# Patient Record
Sex: FEMALE | Race: WHITE | NOT HISPANIC OR LATINO | Employment: OTHER | URBAN - METROPOLITAN AREA
[De-identification: names, ages, dates, MRNs, and addresses within clinical notes are randomized per-mention and may not be internally consistent; named-entity substitution may affect disease eponyms.]

---

## 2010-07-27 LAB — CRC RECOMMENDATION EXT: NORMAL

## 2017-10-17 ENCOUNTER — HISTORICAL (OUTPATIENT)
Dept: LAB | Facility: HOSPITAL | Age: 67
End: 2017-10-17

## 2017-10-17 LAB
ABS NEUT (OLG): 4.8 X10(3)/MCL (ref 2.1–9.2)
ALBUMIN SERPL-MCNC: 4.1 GM/DL (ref 3.4–5)
ALBUMIN/GLOB SERPL: 1.2 RATIO (ref 1.1–2)
ALP SERPL-CCNC: 94 UNIT/L (ref 38–126)
ALT SERPL-CCNC: 38 UNIT/L (ref 12–78)
AST SERPL-CCNC: 25 UNIT/L (ref 15–37)
BASOPHILS # BLD AUTO: 0 X10(3)/MCL (ref 0–0.2)
BASOPHILS NFR BLD AUTO: 1 %
BILIRUB SERPL-MCNC: 0.6 MG/DL (ref 0.2–1)
BILIRUBIN DIRECT+TOT PNL SERPL-MCNC: 0.1 MG/DL (ref 0–0.5)
BILIRUBIN DIRECT+TOT PNL SERPL-MCNC: 0.5 MG/DL (ref 0–0.8)
BUN SERPL-MCNC: 24 MG/DL (ref 7–18)
CALCIUM SERPL-MCNC: 10.2 MG/DL (ref 8.5–10.1)
CHLORIDE SERPL-SCNC: 105 MMOL/L (ref 98–107)
CHOLEST SERPL-MCNC: 183 MG/DL (ref 0–200)
CHOLEST/HDLC SERPL: 2.3 {RATIO} (ref 0–4)
CO2 SERPL-SCNC: 27 MMOL/L (ref 21–32)
CREAT SERPL-MCNC: 0.58 MG/DL (ref 0.55–1.02)
EOSINOPHIL # BLD AUTO: 0.2 X10(3)/MCL (ref 0–0.9)
EOSINOPHIL NFR BLD AUTO: 3 %
ERYTHROCYTE [DISTWIDTH] IN BLOOD BY AUTOMATED COUNT: 12.4 % (ref 11.5–17)
GLOBULIN SER-MCNC: 3.3 GM/DL (ref 2.4–3.5)
GLUCOSE SERPL-MCNC: 82 MG/DL (ref 74–106)
HCT VFR BLD AUTO: 42.1 % (ref 37–47)
HDLC SERPL-MCNC: 80 MG/DL (ref 35–60)
HGB BLD-MCNC: 13.5 GM/DL (ref 12–16)
HIV 1+2 AB+HIV1 P24 AG SERPL QL IA: NEGATIVE
LDLC SERPL CALC-MCNC: 91 MG/DL (ref 0–129)
LYMPHOCYTES # BLD AUTO: 1.4 X10(3)/MCL (ref 0.6–4.6)
LYMPHOCYTES NFR BLD AUTO: 20 %
MCH RBC QN AUTO: 30.6 PG (ref 27–31)
MCHC RBC AUTO-ENTMCNC: 32.1 GM/DL (ref 33–36)
MCV RBC AUTO: 95.5 FL (ref 80–94)
MONOCYTES # BLD AUTO: 0.4 X10(3)/MCL (ref 0.1–1.3)
MONOCYTES NFR BLD AUTO: 6 %
NEUTROPHILS # BLD AUTO: 4.8 X10(3)/MCL (ref 2.1–9.2)
NEUTROPHILS NFR BLD AUTO: 70 %
PLATELET # BLD AUTO: 284 X10(3)/MCL (ref 130–400)
PMV BLD AUTO: 9.6 FL (ref 9.4–12.4)
POTASSIUM SERPL-SCNC: 4.4 MMOL/L (ref 3.5–5.1)
PROT SERPL-MCNC: 7.4 GM/DL (ref 6.4–8.2)
RBC # BLD AUTO: 4.41 X10(6)/MCL (ref 4.2–5.4)
RPR SER QL: NORMAL
SODIUM SERPL-SCNC: 140 MMOL/L (ref 136–145)
TRIGL SERPL-MCNC: 61 MG/DL (ref 30–150)
TSH SERPL-ACNC: 1.5 MIU/ML (ref 0.36–3.74)
VLDLC SERPL CALC-MCNC: 12 MG/DL
WBC # SPEC AUTO: 6.9 X10(3)/MCL (ref 4.5–11.5)

## 2019-06-19 LAB
BUN SERPL-MCNC: 35 MG/DL (ref 7–18)
CHOLEST SERPL-MCNC: 222 MG/DL
CREAT SERPL-MCNC: 0.7 MG/DL (ref 0.6–1.3)
GLUCOSE SERPL-MCNC: 108 MG/DL (ref 74–106)
HDLC SERPL-MCNC: 83 MG/DL (ref 35–60)
LDLC SERPL CALC-MCNC: 122 MG/DL
TRIGL SERPL-MCNC: 84 MG/DL (ref 30–150)

## 2021-08-14 ENCOUNTER — HISTORICAL (OUTPATIENT)
Dept: LAB | Facility: HOSPITAL | Age: 71
End: 2021-08-14

## 2021-08-14 LAB
ABS NEUT (OLG): 5.2 X10(3)/MCL (ref 1.5–6.9)
ERYTHROCYTE [DISTWIDTH] IN BLOOD BY AUTOMATED COUNT: 13.3 % (ref 11.5–17)
HCT VFR BLD AUTO: 33.2 % (ref 36–48)
HGB BLD-MCNC: 10.4 GM/DL (ref 12–16)
MCH RBC QN AUTO: 31 PG (ref 27–34)
MCHC RBC AUTO-ENTMCNC: 31 GM/DL (ref 31–36)
MCV RBC AUTO: 98 FL (ref 80–99)
PLATELET # BLD AUTO: 357 X10(3)/MCL (ref 140–400)
PMV BLD AUTO: 9.9 FL (ref 6.8–10)
RBC # BLD AUTO: 3.38 X10(6)/MCL (ref 4.2–5.4)
WBC # SPEC AUTO: 7.5 X10(3)/MCL (ref 4.5–11.5)

## 2021-10-10 ENCOUNTER — HISTORICAL (OUTPATIENT)
Dept: ADMINISTRATIVE | Facility: HOSPITAL | Age: 71
End: 2021-10-10

## 2021-10-10 LAB
COLOR STL: NORMAL
COLOR STL: NORMAL
CONSISTENCY STL: NORMAL
CONSISTENCY STL: NORMAL
HEMOCCULT SP2 STL QL: NEGATIVE

## 2022-03-25 ENCOUNTER — HISTORICAL (OUTPATIENT)
Dept: RADIOLOGY | Facility: HOSPITAL | Age: 72
End: 2022-03-25

## 2022-03-25 ENCOUNTER — HISTORICAL (OUTPATIENT)
Dept: ADMINISTRATIVE | Facility: HOSPITAL | Age: 72
End: 2022-03-25

## 2022-04-10 ENCOUNTER — HISTORICAL (OUTPATIENT)
Dept: ADMINISTRATIVE | Facility: HOSPITAL | Age: 72
End: 2022-04-10
Payer: MEDICARE

## 2022-04-27 VITALS
WEIGHT: 141.13 LBS | OXYGEN SATURATION: 93 % | HEIGHT: 62 IN | BODY MASS INDEX: 25.97 KG/M2 | DIASTOLIC BLOOD PRESSURE: 82 MMHG | SYSTOLIC BLOOD PRESSURE: 138 MMHG

## 2022-05-26 ENCOUNTER — HOSPITAL ENCOUNTER (EMERGENCY)
Facility: HOSPITAL | Age: 72
Discharge: HOME | End: 2022-05-26
Attending: EMERGENCY MEDICINE
Payer: MEDICARE

## 2022-05-26 VITALS
OXYGEN SATURATION: 95 % | WEIGHT: 135 LBS | TEMPERATURE: 98 F | RESPIRATION RATE: 15 BRPM | BODY MASS INDEX: 25.49 KG/M2 | SYSTOLIC BLOOD PRESSURE: 121 MMHG | DIASTOLIC BLOOD PRESSURE: 84 MMHG | HEART RATE: 101 BPM | HEIGHT: 61 IN

## 2022-05-26 DIAGNOSIS — R19.7 NAUSEA VOMITING AND DIARRHEA: Primary | ICD-10-CM

## 2022-05-26 DIAGNOSIS — R11.2 NAUSEA VOMITING AND DIARRHEA: Primary | ICD-10-CM

## 2022-05-26 PROBLEM — I21.4 NSTEMI (NON-ST ELEVATED MYOCARDIAL INFARCTION): Status: ACTIVE | Noted: 2021-08-08

## 2022-05-26 LAB
ALBUMIN SERPL-MCNC: 3.9 GM/DL (ref 3.4–4.8)
ALBUMIN/GLOB SERPL: 1.1 RATIO (ref 1.1–2)
ALP SERPL-CCNC: 72 UNIT/L (ref 40–150)
ALT SERPL-CCNC: 20 UNIT/L (ref 0–55)
APPEARANCE UR: CLEAR
AST SERPL-CCNC: 20 UNIT/L (ref 5–34)
BACTERIA #/AREA URNS AUTO: NORMAL /HPF
BASOPHILS # BLD AUTO: 0.02 X10(3)/MCL (ref 0–0.2)
BASOPHILS NFR BLD AUTO: 0.1 %
BILIRUB UR QL STRIP.AUTO: NEGATIVE MG/DL
BILIRUBIN DIRECT+TOT PNL SERPL-MCNC: 0.5 MG/DL
BUN SERPL-MCNC: 33 MG/DL (ref 9.8–20.1)
CALCIUM SERPL-MCNC: 9.7 MG/DL (ref 8.4–10.2)
CHLORIDE SERPL-SCNC: 106 MMOL/L (ref 98–107)
CO2 SERPL-SCNC: 26 MMOL/L (ref 23–31)
COLOR UR AUTO: YELLOW
CREAT SERPL-MCNC: 0.74 MG/DL (ref 0.55–1.02)
EOSINOPHIL # BLD AUTO: 0.03 X10(3)/MCL (ref 0–0.9)
EOSINOPHIL NFR BLD AUTO: 0.2 %
ERYTHROCYTE [DISTWIDTH] IN BLOOD BY AUTOMATED COUNT: 12.9 % (ref 11.5–17)
GLOBULIN SER-MCNC: 3.5 GM/DL (ref 2.4–3.5)
GLUCOSE SERPL-MCNC: 142 MG/DL (ref 82–115)
GLUCOSE UR QL STRIP.AUTO: NEGATIVE MG/DL
HCT VFR BLD AUTO: 46.9 % (ref 37–47)
HGB BLD-MCNC: 14.8 GM/DL (ref 12–16)
IMM GRANULOCYTES # BLD AUTO: 0.05 X10(3)/MCL (ref 0–0.02)
IMM GRANULOCYTES NFR BLD AUTO: 0.4 % (ref 0–0.43)
KETONES UR QL STRIP.AUTO: NEGATIVE MG/DL
LEUKOCYTE ESTERASE UR QL STRIP.AUTO: NEGATIVE UNIT/L
LIPASE SERPL-CCNC: 63 U/L
LYMPHOCYTES # BLD AUTO: 0.22 X10(3)/MCL (ref 0.6–4.6)
LYMPHOCYTES NFR BLD AUTO: 1.6 %
MCH RBC QN AUTO: 31.8 PG (ref 27–31)
MCHC RBC AUTO-ENTMCNC: 31.6 MG/DL (ref 33–36)
MCV RBC AUTO: 100.9 FL (ref 80–94)
MONOCYTES # BLD AUTO: 0.48 X10(3)/MCL (ref 0.1–1.3)
MONOCYTES NFR BLD AUTO: 3.4 %
NEUTROPHILS # BLD AUTO: 13.1 X10(3)/MCL (ref 2.1–9.2)
NEUTROPHILS NFR BLD AUTO: 94.3 %
NITRITE UR QL STRIP.AUTO: NEGATIVE
PH UR STRIP.AUTO: 5.5 [PH]
PLATELET # BLD AUTO: 291 X10(3)/MCL (ref 130–400)
PMV BLD AUTO: 9.4 FL (ref 9.4–12.4)
POTASSIUM SERPL-SCNC: 5.4 MMOL/L (ref 3.5–5.1)
PROT SERPL-MCNC: 7.4 GM/DL (ref 5.8–7.6)
PROT UR QL STRIP.AUTO: NEGATIVE MG/DL
RBC # BLD AUTO: 4.65 X10(6)/MCL (ref 4.2–5.4)
RBC #/AREA URNS AUTO: NORMAL /HPF
RBC UR QL AUTO: ABNORMAL UNIT/L
SODIUM SERPL-SCNC: 139 MMOL/L (ref 136–145)
SP GR UR STRIP.AUTO: 1.02
SQUAMOUS #/AREA URNS AUTO: NORMAL /LPF
UROBILINOGEN UR STRIP-ACNC: 0.2 MG/DL
WBC # SPEC AUTO: 13.9 X10(3)/MCL (ref 4.5–11.5)
WBC #/AREA URNS AUTO: NORMAL /HPF

## 2022-05-26 PROCEDURE — 81001 URINALYSIS AUTO W/SCOPE: CPT | Performed by: NURSE PRACTITIONER

## 2022-05-26 PROCEDURE — 83690 ASSAY OF LIPASE: CPT | Performed by: NURSE PRACTITIONER

## 2022-05-26 PROCEDURE — 96374 THER/PROPH/DIAG INJ IV PUSH: CPT

## 2022-05-26 PROCEDURE — 63600175 PHARM REV CODE 636 W HCPCS: Performed by: NURSE PRACTITIONER

## 2022-05-26 PROCEDURE — 80053 COMPREHEN METABOLIC PANEL: CPT | Performed by: NURSE PRACTITIONER

## 2022-05-26 PROCEDURE — 36415 COLL VENOUS BLD VENIPUNCTURE: CPT | Performed by: NURSE PRACTITIONER

## 2022-05-26 PROCEDURE — 99285 EMERGENCY DEPT VISIT HI MDM: CPT | Mod: 25

## 2022-05-26 PROCEDURE — 85025 COMPLETE CBC W/AUTO DIFF WBC: CPT | Performed by: NURSE PRACTITIONER

## 2022-05-26 RX ORDER — ONDANSETRON 2 MG/ML
4 INJECTION INTRAMUSCULAR; INTRAVENOUS
Status: COMPLETED | OUTPATIENT
Start: 2022-05-26 | End: 2022-05-26

## 2022-05-26 RX ORDER — ONDANSETRON 4 MG/1
2 TABLET, FILM COATED ORAL EVERY 6 HOURS PRN
Qty: 6 TABLET | Refills: 0 | Status: SHIPPED | OUTPATIENT
Start: 2022-05-26 | End: 2022-05-29

## 2022-05-26 RX ADMIN — ONDANSETRON 4 MG: 2 INJECTION INTRAMUSCULAR; INTRAVENOUS at 03:05

## 2022-05-26 RX ADMIN — SODIUM CHLORIDE, POTASSIUM CHLORIDE, SODIUM LACTATE AND CALCIUM CHLORIDE 500 ML: 600; 310; 30; 20 INJECTION, SOLUTION INTRAVENOUS at 03:05

## 2022-05-26 NOTE — DISCHARGE INSTRUCTIONS
Clear liquids for 12 hours and then slowly advance diet.  Follow-up with your primary care provider in 2-3 days.  Return to the emergency department for fever, uncontrolled vomiting, worsening of symptoms.

## 2022-05-26 NOTE — ED PROVIDER NOTES
"Encounter Date: 5/26/2022       History     Chief Complaint   Patient presents with    Nausea    Vomiting     BIBA from [facility] for N,V,D and abd cramping     72-year-old female presents to the emergency department per EMS.  She began having nausea this morning and then began vomiting.  She said then she started having diarrhea.  She is having mild upper abdominal pain mostly to the right upper quadrant and epigastric area which she describes as "sick".  She denies fever, dysuria, cough.  She is having severe nausea at present.  She denies any sick contacts, but she does live in a nursing home. Nothing has made symptoms better and nothing has worsened them .         Review of patient's allergies indicates:   Allergen Reactions    Codeine Itching     Past Medical History:   Diagnosis Date    Arthritis     Depression     GERD (gastroesophageal reflux disease)     HLD (hyperlipidemia)     Left hemiparesis     MI (myocardial infarction)     Osteoporosis     Restless leg syndrome     Stroke     Thalamic syndrome      Past Surgical History:   Procedure Laterality Date    none       History reviewed. No pertinent family history.  Social History     Tobacco Use    Smoking status: Unknown If Ever Smoked   Substance Use Topics    Alcohol use: Not Currently    Drug use: Never     Review of Systems   Constitutional: Negative.    HENT: Negative.    Eyes: Negative.    Respiratory: Negative.  Negative for cough.    Cardiovascular: Negative.    Gastrointestinal: Positive for abdominal pain, diarrhea, nausea and vomiting.   Endocrine: Negative.    Genitourinary: Negative.  Negative for dysuria.   Musculoskeletal: Negative.    Skin: Negative.    Allergic/Immunologic: Negative.    Neurological: Negative.    Hematological: Negative.    Psychiatric/Behavioral: Negative.        Physical Exam     Initial Vitals [05/26/22 1449]   BP Pulse Resp Temp SpO2   (!) 182/111 103 16 98 °F (36.7 °C) 98 %      MAP       --     "     Physical Exam    Nursing note and vitals reviewed.  Constitutional: Vital signs are normal. She appears well-developed. She is Obese . She is cooperative.   HENT:   Head: Normocephalic and atraumatic.   Right Ear: External ear normal.   Left Ear: External ear normal.   Nose: Nose normal. Right sinus exhibits no maxillary sinus tenderness and no frontal sinus tenderness. Left sinus exhibits no maxillary sinus tenderness and no frontal sinus tenderness.   Mouth/Throat: Uvula is midline and oropharynx is clear and moist. No oropharyngeal exudate.   Mucous membranes tacky.   Eyes: Conjunctivae, EOM and lids are normal. Pupils are equal, round, and reactive to light.   Neck: Neck supple.   Normal range of motion.   Full passive range of motion without pain.     Cardiovascular: Normal rate and regular rhythm.   Pulmonary/Chest: Effort normal and breath sounds normal.   Abdominal: Abdomen is soft and flat. There is abdominal tenderness in the right upper quadrant and epigastric area.   Musculoskeletal:         General: Normal range of motion.      Cervical back: Full passive range of motion without pain, normal range of motion and neck supple.     Lymphadenopathy:        Head (right side): No submental, no submandibular, no tonsillar, no preauricular, no posterior auricular and no occipital adenopathy present.        Head (left side): No submental, no submandibular, no tonsillar, no preauricular, no posterior auricular and no occipital adenopathy present.     She has no cervical adenopathy.   Neurological: She is alert and oriented to person, place, and time. She has normal strength.   Skin: Skin is warm, dry and intact. No rash noted.   Psychiatric: She has a normal mood and affect. Her speech is normal and behavior is normal. Thought content normal. She exhibits abnormal recent memory and abnormal remote memory.   Patient has some memory issues related to her stroke in the past.         ED Course   Procedures  Labs  Reviewed   COMPREHENSIVE METABOLIC PANEL - Abnormal; Notable for the following components:       Result Value    Potassium Level 5.4 (*)     Glucose Level 142 (*)     Blood Urea Nitrogen 33.0 (*)     All other components within normal limits   URINALYSIS, REFLEX TO URINE CULTURE - Abnormal; Notable for the following components:    Blood, UA Moderate (*)     All other components within normal limits   LIPASE - Abnormal; Notable for the following components:    Lipase Level 63 (*)     All other components within normal limits   CBC WITH DIFFERENTIAL - Abnormal; Notable for the following components:    WBC 13.9 (*)     .9 (*)     MCH 31.8 (*)     MCHC 31.6 (*)     Lymph # 0.22 (*)     Neut # 13.1 (*)     IG# 0.05 (*)     All other components within normal limits   URINALYSIS, MICROSCOPIC - Normal   CBC W/ AUTO DIFFERENTIAL    Narrative:     The following orders were created for panel order CBC Auto Differential.  Procedure                               Abnormality         Status                     ---------                               -----------         ------                     CBC with Differential[[id number]]        Abnormal            Final result                 Please view results for these tests on the individual orders.     Admission on 05/26/2022   Component Date Value Ref Range Status    Sodium Level 05/26/2022 139  136 - 145 mmol/L Final    Potassium Level 05/26/2022 5.4 (A) 3.5 - 5.1 mmol/L Final    Chloride 05/26/2022 106  98 - 107 mmol/L Final    Carbon Dioxide 05/26/2022 26  23 - 31 mmol/L Final    Glucose Level 05/26/2022 142 (A) 82 - 115 mg/dL Final    Blood Urea Nitrogen 05/26/2022 33.0 (A) 9.8 - 20.1 mg/dL Final    Creatinine 05/26/2022 0.74  0.55 - 1.02 mg/dL Final    Calcium Level Total 05/26/2022 9.7  8.4 - 10.2 mg/dL Final    Protein Total 05/26/2022 7.4  5.8 - 7.6 gm/dL Final    Albumin Level 05/26/2022 3.9  3.4 - 4.8 gm/dL Final    Globulin 05/26/2022 3.5  2.4 - 3.5 gm/dL  Final    Albumin/Globulin Ratio 05/26/2022 1.1  1.1 - 2.0 ratio Final    Bilirubin Total 05/26/2022 0.5  <=1.5 mg/dL Final    Alkaline Phosphatase 05/26/2022 72  40 - 150 unit/L Final    Alanine Aminotransferase 05/26/2022 20  0 - 55 unit/L Final    Aspartate Aminotransferase 05/26/2022 20  5 - 34 unit/L Final    Estimated GFR-Non  05/26/2022 >60  mls/min/1.73/m2 Final    Color, UA 05/26/2022 Yellow  Yellow, Colorless, Other, Clear Final    Appearance, UA 05/26/2022 Clear  Clear Final    Specific Gravity, UA 05/26/2022 1.020   Final    pH, UA 05/26/2022 5.5  5.0, 5.5, 6.0, 6.5, 7.0, 7.5, 8.0, 8.5 Final    Protein, UA 05/26/2022 Negative  Negative, 300  mg/dL Final    Glucose, UA 05/26/2022 Negative  Negative, Normal mg/dL Final    Ketones, UA 05/26/2022 Negative  Negative, +1, +2, +3, +4, +5, >=160 mg/dL Final    Blood, UA 05/26/2022 Moderate (A) Negative unit/L Final    Bilirubin, UA 05/26/2022 Negative  Negative mg/dL Final    Urobilinogen, UA 05/26/2022 0.2  0.2, 1.0, Normal mg/dL Final    Nitrites, UA 05/26/2022 Negative  Negative Final    Leukocyte Esterase, UA 05/26/2022 Negative  Negative, 75  unit/L Final    Lipase Level 05/26/2022 63 (A) <=60 U/L Final    WBC 05/26/2022 13.9 (A) 4.5 - 11.5 x10(3)/mcL Final    RBC 05/26/2022 4.65  4.20 - 5.40 x10(6)/mcL Final    Hgb 05/26/2022 14.8  12.0 - 16.0 gm/dL Final    Hct 05/26/2022 46.9  37.0 - 47.0 % Final    MCV 05/26/2022 100.9 (A) 80.0 - 94.0 fL Final    MCH 05/26/2022 31.8 (A) 27.0 - 31.0 pg Final    MCHC 05/26/2022 31.6 (A) 33.0 - 36.0 mg/dL Final    RDW 05/26/2022 12.9  11.5 - 17.0 % Final    Platelet 05/26/2022 291  130 - 400 x10(3)/mcL Final    MPV 05/26/2022 9.4  9.4 - 12.4 fL Final    Neut % 05/26/2022 94.3  % Final    Lymph % 05/26/2022 1.6  % Final    Mono % 05/26/2022 3.4  % Final    Eos % 05/26/2022 0.2  % Final    Basophil % 05/26/2022 0.1  % Final    Lymph # 05/26/2022 0.22 (A) 0.6 - 4.6 x10(3)/mcL  Final    Neut # 05/26/2022 13.1 (A) 2.1 - 9.2 x10(3)/mcL Final    Mono # 05/26/2022 0.48  0.1 - 1.3 x10(3)/mcL Final    Eos # 05/26/2022 0.03  0 - 0.9 x10(3)/mcL Final    Baso # 05/26/2022 0.02  0 - 0.2 x10(3)/mcL Final    IG# 05/26/2022 0.05 (A) 0 - 0.0155 x10(3)/mcL Final    IG% 05/26/2022 0.4  0 - 0.43 % Final    Bacteria, UA 05/26/2022 Rare  None Seen, Rare, Occasional /HPF Final    RBC, UA 05/26/2022 3-5  None Seen, 0-2, 3-5, 0-5 /HPF Final    WBC, UA 05/26/2022 0-2  None Seen, 0-2, 3-5, 0-5 /HPF Final    Squamous Epithelial Cells, UA 05/26/2022 Rare  None Seen, Rare, Occasional, Occ /LPF Final            Imaging Results          CT Abdomen Pelvis  Without Contrast (Final result)  Result time 05/26/22 17:41:22    Final result by Silvino Nelson MD (05/26/22 17:41:22)                 Impression:      No acute intra-abdominal or intrapelvic process.      Electronically signed by: Silvino Nelson MD  Date:    05/26/2022  Time:    17:41             Narrative:    EXAMINATION:  CT of the abdomen pelvis without contrast    CLINICAL HISTORY:  Nausea and vomiting, abdominal pain    COMPARISON:  None    TECHNIQUE:  Routine CT of the abdomen pelvis performed without intravenous contrast    Total DLP: 478.9 mGy.cm    Automatic exposure control was utilized to reduce the patient's dose    FINDINGS:  Evaluation of the solid organs and bowel is limited without intravenous or oral contrast.  There is linear opacities seen in the right lung base which may reflect atelectasis and or scarring.    There is moderate size sliding hiatal hernia.  The noncontrast images of the liver, spleen, pancreas, gallbladder, adrenal glands, and kidneys appear unremarkable.  Mild atherosclerotic calcifications of the abdominal aorta and its branches are noted without aneurysmal dilatation.  No abdominopelvic adenopathy.  No free air, free fluid, fluid collection.  There is no high-grade bowel obstruction or bowel inflammatory changes.   The appendix appears normal.    No osseous destructive changes.  Spondylosis of the spine noted.                                 Medications   ondansetron injection 4 mg (4 mg Intravenous Given 5/26/22 1527)   lactated ringers bolus 500 mL (500 mLs Intravenous New Bag 5/26/22 1526)                          Clinical Impression:   Final diagnoses:  [R11.2, R19.7] Nausea vomiting and diarrhea (Primary)          ED Disposition Condition    Discharge Stable        ED Prescriptions     Medication Sig Dispense Start Date End Date Auth. Provider    ondansetron (ZOFRAN) 4 MG tablet Take 0.5 tablets (2 mg total) by mouth every 6 (six) hours as needed for Nausea (N/V). 6 tablet 5/26/2022 5/29/2022 ELIEZER Davila        Follow-up Information     Follow up With Specialties Details Why Contact Info    Angelo Denis MD Internal Medicine Schedule an appointment as soon as possible for a visit in 2 days  [address] [place] [address] [place], [facility] [address]  [phone]             ELIEZER Davila  05/26/22 9475

## 2022-09-17 ENCOUNTER — HISTORICAL (OUTPATIENT)
Dept: ADMINISTRATIVE | Facility: HOSPITAL | Age: 72
End: 2022-09-17
Payer: MEDICARE

## 2023-01-11 ENCOUNTER — DOCUMENTATION ONLY (OUTPATIENT)
Dept: ADMINISTRATIVE | Facility: HOSPITAL | Age: 73
End: 2023-01-11
Payer: MEDICARE

## 2023-02-21 ENCOUNTER — HOSPITAL ENCOUNTER (EMERGENCY)
Facility: HOSPITAL | Age: 73
Discharge: HOME | End: 2023-02-21
Attending: INTERNAL MEDICINE
Payer: MEDICARE

## 2023-02-21 VITALS
SYSTOLIC BLOOD PRESSURE: 139 MMHG | BODY MASS INDEX: 26.45 KG/M2 | HEART RATE: 80 BPM | RESPIRATION RATE: 18 BRPM | DIASTOLIC BLOOD PRESSURE: 76 MMHG | WEIGHT: 140 LBS | OXYGEN SATURATION: 98 % | TEMPERATURE: 99 F

## 2023-02-21 DIAGNOSIS — S00.90XA SUPERFICIAL HEAD INJURY, INITIAL ENCOUNTER: Primary | ICD-10-CM

## 2023-02-21 PROCEDURE — 25000003 PHARM REV CODE 250: Performed by: INTERNAL MEDICINE

## 2023-02-21 PROCEDURE — 99283 EMERGENCY DEPT VISIT LOW MDM: CPT

## 2023-02-21 RX ORDER — ACETAMINOPHEN 500 MG
500 TABLET ORAL
Status: COMPLETED | OUTPATIENT
Start: 2023-02-21 | End: 2023-02-21

## 2023-02-21 RX ORDER — ONDANSETRON 4 MG/1
4 TABLET, ORALLY DISINTEGRATING ORAL ONCE
Status: COMPLETED | OUTPATIENT
Start: 2023-02-21 | End: 2023-02-21

## 2023-02-21 RX ADMIN — ACETAMINOPHEN 500 MG: 500 TABLET ORAL at 01:02

## 2023-02-21 RX ADMIN — ONDANSETRON 4 MG: 4 TABLET, ORALLY DISINTEGRATING ORAL at 01:02

## 2023-02-21 NOTE — ED PROVIDER NOTES
Source of History:  Patient, no limitations    Chief complaint:  Fall (Reports fall while getting into bed just PTA hitting L temporal area on wooden side table. Denies LOC. No blood thinner.)      HPI:  Laly Horton is a 72 y.o. female presenting with Fall (Reports fall while getting into bed just PTA hitting L temporal area on wooden side table. Denies LOC. No blood thinner.)       Complicated hx of hemorrhagic stroke and left side hemiparesis, presents with a very low level impact head injury, fell forward while bending over reaching for object hitting left forehead area on side table, no HA, no vision changes, no blood thinner, just wants to get checked out    Patient presents complaining of  forehead  pain with radiation to left eyebrow. Onset of symptoms was abrupt starting a few minutes ago. Mechanism of injury was a fall. Loss of consciousness did not occur. Pain is described as  no longer present . Severity of symptoms at onset was mild and now is none. Symptoms have been resolved. Care prior to arrival consisted of nothing, with complete relief.        Review of Systems   Constitutional symptoms:  Negative except as documented in HPI.   Skin symptoms:  Negative except as documented in HPI.   HEENT symptoms:  Negative except as documented in HPI.   Respiratory symptoms:  Negative except as documented in HPI.   Cardiovascular symptoms:  Negative except as documented in HPI.   Gastrointestinal symptoms:  Negative except as documented in HPI.    Genitourinary symptoms:  Negative except as documented in HPI.   Musculoskeletal symptoms:  Negative except as documented in HPI.   Neurologic symptoms:  Negative except as documented in HPI.   Psychiatric symptoms:  Negative except as documented in HPI.   Allergy/immunologic symptoms:  Negative except as documented in HPI.             Additional review of systems information: All other systems reviewed and otherwise negative.      Review of patient's allergies  indicates:   Allergen Reactions    Codeine Itching       PMH:  As per HPI and below:    Past Medical History:   Diagnosis Date    Arthritis     Depression     GERD (gastroesophageal reflux disease)     HLD (hyperlipidemia)     Left hemiparesis     MI (myocardial infarction)     Osteoporosis     Restless leg syndrome     Stroke     Thalamic syndrome         History reviewed. No pertinent family history.    Past Surgical History:   Procedure Laterality Date    COLONOSCOPY  07/27/2010    none         Social History     Tobacco Use    Smoking status: Unknown   Substance Use Topics    Alcohol use: Not Currently    Drug use: Never       Patient Active Problem List   Diagnosis    CVA (cerebral vascular accident)    GERD (gastroesophageal reflux disease)    HLD (hyperlipidemia)    Insomnia    Left hemiparesis    NSTEMI (non-ST elevated myocardial infarction)    Osteoporosis    RLS (restless legs syndrome)    Thalamic syndrome    Vasomotor rhinitis        Physical Exam:    /76   Pulse 80   Temp 98.5 °F (36.9 °C)   Resp 18   Wt 63.5 kg (140 lb)   SpO2 98%   BMI 26.45 kg/m²     Nursing note and vital signs reviewed.    General:  Alert, no acute distress.   Skin: Normal for Ethnic Origin, No cyanosis  HEENT: Normocephalic and atraumatic, Vision unchanged, Pupils symmetric, No icterus , Nasal mucosa is pink and moist  Cardiovascular:  Regular rate and rhythm, No edema  Chest Wall: No deformity, equal chest rise  Respiratory:  Lungs are clear to auscultation, respirations are non-labored.    Musculoskeletal:  No deformity, Normal perfusion to all extremities  Gastrointestinal:  Soft, Non distended  Neurological:  Alert and oriented, left side hemiparesis, normal speech observed.    Psychiatric:  Cooperative, appropriate mood & affect.        Labs that have been ordered have been independently reviewed and interpreted by myself.     Old Chart Reviewed.      Initial Impression/ Differential Dx:  Scalp contusion,  concussion, laceration, skull fracture,diffuse axonal injury, countercoup injury, intracranial hemorrhage such as subdural hematoma, subarachnoid hemorrhage or epidural hematoma, cerebral contusion, soft tissue injury, paraspinal or spinal injury      MDM:      Reviewed Nurses Note.    Reviewed Pertinent old records.    Orders Placed This Encounter    acetaminophen tablet 500 mg    ondansetron disintegrating tablet 4 mg                    Labs Reviewed - No data to display       No orders to display        No visits with results within 1 Day(s) from this visit.   Latest known visit with results is:   Documentation Only on 01/11/2023   Component Date Value Ref Range Status    CRC Recommendation External 07/27/2010 Repeat colonoscopy in 7 years   Final       Imaging Results    None                                              Diagnostic Impression:    1. Superficial head injury, initial encounter         ED Disposition Condition    Discharge Stable             Follow-up Information       [facility] - Emergency Dept.    Specialty: Emergency Medicine  Why: If symptoms worsen  Contact information:  [address]  [facility] [id number] [phone]                            ED Prescriptions    None       Follow-up Information       Follow up With Specialties Details Why Contact Info    [facility] - Emergency Dept Emergency Medicine  If symptoms worsen [address]  [facility] [id number] [phone]             Erickson Crowell,   02/21/23 0454

## 2023-08-29 ENCOUNTER — HOSPITAL ENCOUNTER (OUTPATIENT)
Dept: RADIOLOGY | Facility: HOSPITAL | Age: 73
Discharge: HOME | End: 2023-08-29
Attending: INTERNAL MEDICINE
Payer: MEDICARE

## 2023-08-29 DIAGNOSIS — M25.511 RIGHT SHOULDER PAIN: ICD-10-CM

## 2023-08-29 PROCEDURE — 73030 X-RAY EXAM OF SHOULDER: CPT | Mod: TC,RT
